# Patient Record
(demographics unavailable — no encounter records)

---

## 2022-11-08 NOTE — ULT
Divya Mancini  [address] [facility] [address]      2022      : 2018    Dear Ms. Mancini:    We have been trying to reach you without success.  Please call my office at [phone].    Thank you for your timely response.    Sincerely,     Allergy & immunology        EXAM: Pelvic ultrasound



HISTORY: Pelvic pain and bleeding



COMPARISON: None



TECHNIQUE: Multiple grayscale and color Doppler images were obtained in a transabdominal and transvag
inal pelvic ultrasound. Spectral analysis of the Doppler waveforms of the ovaries were performed.





FINDINGS:

CERVIX: No evidence of nabothian cysts.

UTERUS: Normal in size. A small heterogeneous area in the anterior fundal wall measuring 1.6 cm in si
ze could represent a small fibroid.

ENDOMETRIAL STRIPE: 12 mm.



No free fluid is seen in the pelvis.



RIGHT OVARY: Normal flow without focal mass.

LEFT OVARY: Normal flow without focal mass.



IMPRESSION: Questionable small uterine fibroid



Reported By: Lázaro Jaquez 

Electronically Signed:  1/14/2020 2:15 PM